# Patient Record
Sex: FEMALE | Race: WHITE | NOT HISPANIC OR LATINO | Employment: UNEMPLOYED | URBAN - METROPOLITAN AREA
[De-identification: names, ages, dates, MRNs, and addresses within clinical notes are randomized per-mention and may not be internally consistent; named-entity substitution may affect disease eponyms.]

---

## 2019-02-15 RX ORDER — FLUTICASONE PROPIONATE 50 MCG
1 SPRAY, SUSPENSION (ML) NASAL DAILY
COMMUNITY

## 2019-02-15 RX ORDER — PIOGLITAZONEHYDROCHLORIDE 45 MG/1
45 TABLET ORAL DAILY
COMMUNITY

## 2019-02-15 RX ORDER — EXEMESTANE 25 MG/1
25 TABLET ORAL DAILY
COMMUNITY
End: 2019-02-27 | Stop reason: SDUPTHER

## 2019-02-15 RX ORDER — CETIRIZINE HYDROCHLORIDE 10 MG/1
10 TABLET ORAL DAILY
COMMUNITY

## 2019-02-15 RX ORDER — POTASSIUM CHLORIDE 20 MEQ/1
20 TABLET, EXTENDED RELEASE ORAL
COMMUNITY

## 2019-02-15 RX ORDER — FENOFIBRATE 160 MG/1
160 TABLET ORAL DAILY
COMMUNITY
End: 2019-03-12

## 2019-02-15 RX ORDER — CEPHALEXIN 250 MG/1
250 CAPSULE ORAL DAILY
COMMUNITY

## 2019-02-15 RX ORDER — ONDANSETRON 4 MG/1
4 TABLET, FILM COATED ORAL EVERY 6 HOURS PRN
COMMUNITY

## 2019-02-15 RX ORDER — METFORMIN HYDROCHLORIDE 500 MG/1
500 TABLET, EXTENDED RELEASE ORAL 2 TIMES DAILY
COMMUNITY
End: 2019-03-13 | Stop reason: SDUPTHER

## 2019-02-15 RX ORDER — ATENOLOL 50 MG/1
50 TABLET ORAL DAILY
COMMUNITY

## 2019-02-15 RX ORDER — ERGOCALCIFEROL 1.25 MG/1
50000 CAPSULE ORAL
COMMUNITY

## 2019-02-15 RX ORDER — MONTELUKAST SODIUM 10 MG/1
10 TABLET ORAL NIGHTLY
COMMUNITY

## 2019-02-15 RX ORDER — OMEPRAZOLE 40 MG/1
40 CAPSULE, DELAYED RELEASE ORAL EVERY MORNING
COMMUNITY
End: 2019-02-20 | Stop reason: SDUPTHER

## 2019-02-20 ENCOUNTER — OFFICE VISIT (OUTPATIENT)
Dept: FAMILY MEDICINE | Facility: CLINIC | Age: 79
End: 2019-02-20
Payer: MEDICARE

## 2019-02-20 VITALS
DIASTOLIC BLOOD PRESSURE: 72 MMHG | SYSTOLIC BLOOD PRESSURE: 136 MMHG | TEMPERATURE: 99 F | OXYGEN SATURATION: 94 % | RESPIRATION RATE: 18 BRPM | HEIGHT: 63 IN | HEART RATE: 99 BPM | BODY MASS INDEX: 21.65 KG/M2 | WEIGHT: 122.19 LBS

## 2019-02-20 DIAGNOSIS — J91.0 PLEURAL EFFUSION, MALIGNANT: ICD-10-CM

## 2019-02-20 DIAGNOSIS — K21.9 GASTROESOPHAGEAL REFLUX DISEASE, ESOPHAGITIS PRESENCE NOT SPECIFIED: Primary | ICD-10-CM

## 2019-02-20 PROCEDURE — 99213 PR OFFICE/OUTPT VISIT, EST, LEVL III, 20-29 MIN: ICD-10-PCS | Mod: ,,, | Performed by: FAMILY MEDICINE

## 2019-02-20 PROCEDURE — 99213 OFFICE O/P EST LOW 20 MIN: CPT | Mod: ,,, | Performed by: FAMILY MEDICINE

## 2019-02-20 RX ORDER — FENOFIBRATE 160 MG/1
1 TABLET ORAL DAILY
COMMUNITY

## 2019-02-20 RX ORDER — MV-MINS NO.73/IRON FUM/FOLIC 106 MG-1MG
1 CAPSULE ORAL DAILY
Refills: 2 | COMMUNITY
Start: 2019-02-08

## 2019-02-20 RX ORDER — OMEPRAZOLE 40 MG/1
40 CAPSULE, DELAYED RELEASE ORAL EVERY MORNING
Qty: 90 CAPSULE | Refills: 3 | Status: SHIPPED | OUTPATIENT
Start: 2019-02-20

## 2019-02-20 RX ORDER — METFORMIN HYDROCHLORIDE 500 MG/1
1 TABLET ORAL DAILY
COMMUNITY
End: 2019-05-17

## 2019-02-20 RX ORDER — ONDANSETRON 4 MG/1
1 TABLET, ORALLY DISINTEGRATING ORAL EVERY 8 HOURS PRN
COMMUNITY
End: 2019-03-12

## 2019-02-20 NOTE — PROGRESS NOTES
Subjective:       Patient ID: Afshan Schultz is a 78 y.o. female.    Chief Complaint: Follow-up (Hospital F/u was hospitalized at [facility] in January)    79 yo F here for f/u because she has not been here for a while.   Pt is doing well today but she had a bad January: low blood count, diarrhea, bleeding polyp in duodenal (benign), blood transfusion, was taken off of Ibrance b/c of low WBC, being put back on ibrance, taken off eliquis and baby aspirin as blood clot in arm had resolved. Pt had EGD and Colonoscopy.  MRI of head shows her 4 nodules being gone/faded.   Pt also had thoracocentesis done a couple of times.  Pt also has had urologist visits. Pt is going to be on cefalexin as a preventive measure.   Pt would like to have omeprazole refilled.  No other problems or concerns right now.          Review of Systems   Constitutional: Negative for activity change, chills, fatigue, fever and unexpected weight change.   HENT: Negative for ear pain, rhinorrhea and trouble swallowing.    Eyes: Negative for pain.   Respiratory: Negative for cough, chest tightness, shortness of breath and wheezing.    Cardiovascular: Negative for chest pain and palpitations.   Gastrointestinal: Negative for abdominal distention, abdominal pain, constipation, diarrhea, nausea and vomiting.   Endocrine: Negative for cold intolerance and heat intolerance.   Genitourinary: Negative for dysuria, frequency and urgency.   Musculoskeletal: Negative for myalgias.   Skin: Negative for rash.   Neurological: Negative for dizziness, syncope, light-headedness and headaches.   Hematological: Does not bruise/bleed easily.   Psychiatric/Behavioral: Negative for agitation and confusion.       Objective:      Physical Exam   Constitutional: She appears well-developed.   HENT:   Right Ear: External ear normal.   Left Ear: External ear normal.   Mouth/Throat: Oropharynx is clear and moist.   Eyes: Conjunctivae and EOM are normal.   Neck: Normal range of motion.    Cardiovascular: Normal rate, regular rhythm and intact distal pulses.   Pulmonary/Chest: Effort normal and breath sounds normal.   Right lower lobe with less air intake   Abdominal: Soft.   Skin: Skin is warm. Capillary refill takes less than 2 seconds.   Psychiatric: She has a normal mood and affect.       Assessment:       1. Gastroesophageal reflux disease, esophagitis presence not specified    2. Pleural effusion, malignant        Plan:       PROBLEM LIST  No problem-specific Assessment & Plan notes found for this encounter.        Afshan was seen today for follow-up.    Diagnoses and all orders for this visit:    Gastroesophageal reflux disease, esophagitis presence not specified  -     omeprazole (PRILOSEC) 40 MG capsule; Take 1 capsule (40 mg total) by mouth every morning.    Pleural effusion, malignant

## 2019-02-23 DIAGNOSIS — Z17.0 MALIGNANT NEOPLASM OF AXILLARY TAIL OF RIGHT BREAST IN FEMALE, ESTROGEN RECEPTOR POSITIVE: ICD-10-CM

## 2019-02-23 DIAGNOSIS — C50.919 MALIGNANT NEOPLASM OF FEMALE BREAST, UNSPECIFIED ESTROGEN RECEPTOR STATUS, UNSPECIFIED LATERALITY, UNSPECIFIED SITE OF BREAST: Primary | ICD-10-CM

## 2019-02-23 DIAGNOSIS — C50.611 MALIGNANT NEOPLASM OF AXILLARY TAIL OF RIGHT BREAST IN FEMALE, ESTROGEN RECEPTOR POSITIVE: ICD-10-CM

## 2019-02-27 DIAGNOSIS — C50.919 MALIGNANT NEOPLASM OF BREAST, STAGE 4, UNSPECIFIED LATERALITY: Primary | ICD-10-CM

## 2019-02-27 RX ORDER — EXEMESTANE 25 MG/1
TABLET ORAL
Qty: 31 TABLET | Refills: 0 | Status: SHIPPED | OUTPATIENT
Start: 2019-02-27 | End: 2019-03-26 | Stop reason: SDUPTHER

## 2019-03-07 DIAGNOSIS — C50.919 MALIGNANT NEOPLASM OF FEMALE BREAST, UNSPECIFIED ESTROGEN RECEPTOR STATUS, UNSPECIFIED LATERALITY, UNSPECIFIED SITE OF BREAST: Primary | ICD-10-CM

## 2019-03-07 RX ORDER — METFORMIN HYDROCHLORIDE 500 MG/1
TABLET, EXTENDED RELEASE ORAL
Qty: 60 TABLET | Refills: 0 | Status: CANCELLED | OUTPATIENT
Start: 2019-03-07

## 2019-03-12 ENCOUNTER — OFFICE VISIT (OUTPATIENT)
Dept: HEMATOLOGY/ONCOLOGY | Facility: CLINIC | Age: 79
End: 2019-03-12
Payer: MEDICARE

## 2019-03-12 VITALS
BODY MASS INDEX: 21.55 KG/M2 | TEMPERATURE: 98 F | HEIGHT: 63 IN | WEIGHT: 121.63 LBS | RESPIRATION RATE: 18 BRPM | OXYGEN SATURATION: 94 % | SYSTOLIC BLOOD PRESSURE: 148 MMHG | DIASTOLIC BLOOD PRESSURE: 75 MMHG | HEART RATE: 76 BPM

## 2019-03-12 DIAGNOSIS — Z17.0 MALIGNANT NEOPLASM OF UPPER-OUTER QUADRANT OF RIGHT BREAST IN FEMALE, ESTROGEN RECEPTOR POSITIVE: ICD-10-CM

## 2019-03-12 DIAGNOSIS — C50.411 MALIGNANT NEOPLASM OF UPPER-OUTER QUADRANT OF RIGHT BREAST IN FEMALE, ESTROGEN RECEPTOR POSITIVE: ICD-10-CM

## 2019-03-12 DIAGNOSIS — C50.919 BREAST CANCER, STAGE 4, UNSPECIFIED LATERALITY: Primary | ICD-10-CM

## 2019-03-12 PROCEDURE — 99215 PR OFFICE/OUTPT VISIT, EST, LEVL V, 40-54 MIN: ICD-10-PCS | Mod: S$GLB,,, | Performed by: NURSE PRACTITIONER

## 2019-03-12 PROCEDURE — 99215 OFFICE O/P EST HI 40 MIN: CPT | Mod: S$GLB,,, | Performed by: NURSE PRACTITIONER

## 2019-03-12 NOTE — PROGRESS NOTES
Subjective:      Patient ID: Afshan Schultz is a 78 y.o. female.    Oncology History:  Oncology History    [address]  Rt breast cancer, mastectomy, chemo, xrt, AI x 5 years at [facility]    7/16 established care with Dr MARIVEL Ortiz            Breast cancer, stage 4, unspecified laterality    12/4/2017 Imaging Significant Findings     PET hypermet mass in hilar region as well and infraclavicular and mediastinum, sclerotic lesions to clementine ribs, questionable left retroperitoneal pelvic LN noted.          12/21/2017 Initial Diagnosis     RUL lung bx consistent with met breast primary  ER 71%, [place] neg, Her 2 neg          1/4/2018 - 8/23/2018 Hormone Therapy     Arimidex + Xgeva started by Dr Ibarra         4/26/2018 - 8/23/2018 Chemotherapy     Ibrance 125 mg added to Arimidex + Xgeva         8/23/2018 -  Chemotherapy     Ibrance + Aromasin + xgeva         2/4/2019 Imaging Significant Findings     MRI Brain stable sub-4 m foci as compared to 11/5/18              Chief Complaint: Breast Cancer    Afshan Schultz is here for Ibrance 125 mg/aromasin/xgeva  Day 20/21 with low ANC noted. Will recheck in 1 week prior to day 1 of next cycle.   So far the patient appears to tolerate chemotherapy fairly well. The patient does have mild fatigue, decreased appetite.  Today the main concern is Pet ordered for march 2019 not completed as of yet. Will have Issa RN f/u on imaging orders.           Past Medical History:   Diagnosis Date    Allergy     Anemia     Arthritis     Breast cancer     Cervical cancer     Diabetes mellitus     Encounter for blood transfusion     Hypertension     Lung cancer     Stroke      Family History   Problem Relation Age of Onset    Heart failure Mother      Social History     Socioeconomic History    Marital status:      Spouse name: Not on file    Number of children: Not on file    Years of education: Not on file    Highest education level: Not on file   Social Needs    Financial resource  strain: Not on file    Food insecurity - worry: Not on file    Food insecurity - inability: Not on file    Transportation needs - medical: Not on file    Transportation needs - non-medical: Not on file   Occupational History    Not on file   Tobacco Use    Smoking status: Never Smoker    Smokeless tobacco: Never Used   Substance and Sexual Activity    Alcohol use: No     Binge frequency: Never    Drug use: No    Sexual activity: Not on file   Other Topics Concern    Not on file   Social History Narrative    Not on file     Past Surgical History:   Procedure Laterality Date    COLONOSCOPY      HYSTERECTOMY      MASTECTOMY Right            Review of systems:  Review of Systems   Constitutional: Positive for appetite change (pt reports eating well, but has lost weight since last visit). Negative for activity change, fatigue, fever and unexpected weight change.   HENT: Negative for dental problem, mouth sores, nosebleeds, sore throat and voice change.    Eyes: Negative for photophobia and visual disturbance.   Respiratory: Negative for cough, chest tightness and shortness of breath.    Cardiovascular: Negative for chest pain, palpitations and leg swelling.   Gastrointestinal: Negative for abdominal distention, abdominal pain, blood in stool, constipation, diarrhea, nausea and vomiting.   Genitourinary: Negative for dysuria and hematuria.   Musculoskeletal: Negative for arthralgias, back pain, gait problem, joint swelling and myalgias.   Skin: Negative for pallor and rash.   Neurological: Negative for dizziness, syncope, weakness, light-headedness, numbness and headaches.   Hematological: Negative for adenopathy. Does not bruise/bleed easily.   Psychiatric/Behavioral: Negative for agitation, confusion and suicidal ideas.       Objective:     Physical Exam  Vitals:    03/12/19 1008   BP: (!) 148/75   Pulse: 76   Resp: 18   Temp: 97.6 °F (36.4 °C)   Body surface area is 1.57 meters  squared.    Labs:  Reviewed ANC 0.87 will recheck in 1 week     Assessment:      1. Breast cancer, stage 4, unspecified laterality    2. Malignant neoplasm of upper-outer quadrant of right breast in female, estrogen receptor positive            Plan:   Breast cancer, stage 4, unspecified laterality  -     NM PET CT Routine Skull to Mid Thigh; Future; Expected date: 03/12/2019  -     CBC w/ DIFF; Future; Expected date: 03/19/2019  -     CBC w/ DIFF; Future; Expected date: 04/12/2019  -     CMP; Future; Expected date: 04/12/2019    Malignant neoplasm of upper-outer quadrant of right breast in female, estrogen receptor positive   -     NM PET CT Routine Skull to Mid Thigh; Future; Expected date: 03/12/2019    Ok to proceed with xgeva as planned for today     Pt undergoing chemotherapy, with risks, side effects and benefits discussed. Pt is instructed to RTC with labs for continued monitoring of treatment as instructed.     Arabella Story, ANP

## 2019-03-13 DIAGNOSIS — E11.9 TYPE 2 DIABETES MELLITUS WITHOUT COMPLICATION, WITHOUT LONG-TERM CURRENT USE OF INSULIN: Primary | ICD-10-CM

## 2019-03-15 RX ORDER — METFORMIN HYDROCHLORIDE 500 MG/1
500 TABLET, EXTENDED RELEASE ORAL 2 TIMES DAILY
Qty: 60 TABLET | Refills: 1 | Status: SHIPPED | OUTPATIENT
Start: 2019-03-15 | End: 2019-05-14 | Stop reason: SDUPTHER

## 2019-03-18 DIAGNOSIS — C50.919 BREAST CANCER, STAGE 4, UNSPECIFIED LATERALITY: Primary | ICD-10-CM

## 2019-03-18 RX ORDER — PALBOCICLIB 125 MG/1
CAPSULE ORAL
Qty: 21 CAPSULE | Refills: 0 | Status: SHIPPED | OUTPATIENT
Start: 2019-03-18 | End: 2019-05-13 | Stop reason: ALTCHOICE

## 2019-03-19 ENCOUNTER — DOCUMENTATION ONLY (OUTPATIENT)
Dept: HEMATOLOGY/ONCOLOGY | Facility: CLINIC | Age: 79
End: 2019-03-19

## 2019-03-19 DIAGNOSIS — C50.919 BREAST CANCER, STAGE 4, UNSPECIFIED LATERALITY: ICD-10-CM

## 2019-03-20 ENCOUNTER — DOCUMENTATION ONLY (OUTPATIENT)
Dept: HEMATOLOGY/ONCOLOGY | Facility: CLINIC | Age: 79
End: 2019-03-20

## 2019-03-20 ENCOUNTER — TELEPHONE (OUTPATIENT)
Dept: HEMATOLOGY/ONCOLOGY | Facility: CLINIC | Age: 79
End: 2019-03-20

## 2019-03-26 DIAGNOSIS — C50.919 MALIGNANT NEOPLASM OF BREAST, STAGE 4, UNSPECIFIED LATERALITY: ICD-10-CM

## 2019-03-27 RX ORDER — EXEMESTANE 25 MG/1
TABLET ORAL
Qty: 31 TABLET | Refills: 0 | Status: SHIPPED | OUTPATIENT
Start: 2019-03-27

## 2019-04-02 LAB
ABS NRBC COUNT: 0 X 10 3/UL (ref 0–0.01)
ABSOLUTE BASOPHIL: 0.02 X 10 3/UL (ref 0–0.22)
ABSOLUTE EOSINOPHIL: 0.02 X 10 3/UL (ref 0.04–0.54)
ABSOLUTE LYMPHOCYTE: 0.73 X 10 3/UL (ref 0.86–4.75)
ABSOLUTE MONOCYTE: 0.09 X 10 3/UL (ref 0.22–1.08)
BASOPHILS NFR BLD: 1 %
EOSINOPHIL NFR BLD: 1 %
HCT VFR BLD AUTO: 32.3 % (ref 37–47)
HGB BLD-MCNC: 9.9 G/DL (ref 12–16)
LYMPHOCYTES NFR BLD: 31 %
MCH RBC QN AUTO: 28.9 PG (ref 27–32)
MCHC RBC AUTO-ENTMCNC: 30.7 G/DL (ref 32–36)
MCV RBC AUTO: 94.2 FL (ref 82–100)
MONOCYTES NFR BLD: 4 %
NEUTROPHILS ABSOLUTE COUNT: 1.49 X 10 3/UL (ref 2.32–6.7)
NUCLEATED RED BLOOD CELLS: 0 /100 WBC (ref 0–0.2)
PLATELET # BLD AUTO: 218 X 10 3/UL (ref 135–400)
RBC # BLD AUTO: 3.43 X 10 6/UL (ref 4.2–5.4)
RBC & PLT MORPHOLOGY: ABNORMAL
RDW-SD: 60.7 FL (ref 37–54)
SEGMENTERS: 63 %
TOTAL GRANULOCYTES: 1.53 X 10 3/UL
WBC # BLD: 2.36 X 10 3/UL (ref 4.3–10.8)

## 2019-04-05 ENCOUNTER — OFFICE VISIT (OUTPATIENT)
Dept: HEMATOLOGY/ONCOLOGY | Facility: CLINIC | Age: 79
End: 2019-04-05
Payer: MEDICARE

## 2019-04-05 VITALS
RESPIRATION RATE: 18 BRPM | DIASTOLIC BLOOD PRESSURE: 64 MMHG | SYSTOLIC BLOOD PRESSURE: 124 MMHG | OXYGEN SATURATION: 95 % | TEMPERATURE: 98 F | HEIGHT: 63 IN | BODY MASS INDEX: 21.44 KG/M2 | WEIGHT: 121 LBS | HEART RATE: 93 BPM

## 2019-04-05 DIAGNOSIS — C50.919 BREAST CANCER, STAGE 4, UNSPECIFIED LATERALITY: Primary | ICD-10-CM

## 2019-04-05 DIAGNOSIS — E46 PROTEIN-CALORIE MALNUTRITION, UNSPECIFIED SEVERITY: ICD-10-CM

## 2019-04-05 PROCEDURE — 99215 OFFICE O/P EST HI 40 MIN: CPT | Mod: S$GLB,,, | Performed by: NURSE PRACTITIONER

## 2019-04-05 PROCEDURE — 99215 PR OFFICE/OUTPT VISIT, EST, LEVL V, 40-54 MIN: ICD-10-PCS | Mod: S$GLB,,, | Performed by: NURSE PRACTITIONER

## 2019-04-05 RX ORDER — LAMOTRIGINE 25 MG/1
500 TABLET ORAL
Status: CANCELLED | OUTPATIENT
Start: 2019-04-10

## 2019-04-05 NOTE — PROGRESS NOTES
Subjective:      Patient ID: Afshan Schultz is a 78 y.o. female.    Oncology History:  Oncology History    [address]  Rt breast cancer, mastectomy, chemo, xrt, AI x 5 years at [facility]    7/16 established care with Dr MARIVEL Ortiz            Breast cancer, stage 4, unspecified laterality    12/4/2017 Imaging Significant Findings     PET hypermet mass in hilar region as well and infraclavicular and mediastinum, sclerotic lesions to clementine ribs, questionable left retroperitoneal pelvic LN noted.          12/21/2017 Initial Diagnosis     RUL lung bx consistent with met breast primary  ER 71%, [place] neg, Her 2 neg          1/4/2018 - 8/23/2018 Hormone Therapy     Arimidex + Xgeva started by Dr Ibarra         4/26/2018 - 8/23/2018 Chemotherapy     Ibrance 125 mg added to Arimidex + Xgeva         8/23/2018 - 4/5/2019 Chemotherapy     Ibrance + Aromasin + xgeva         2/4/2019 Imaging Significant Findings     MRI Brain stable sub-4 m foci as compared to 11/5/18         4/3/2019 Imaging Significant Findings     CT/PET Widespread met dz, with evidence of disease progression with new lesions involving the left axillary LN, Rt hepatic lobe, rt 3rd rib, T12 vertebral body and pelvic region. Additional lesions present demonstrating increased SUV. Large right pleural effusion.          4/8/2019 -  Chemotherapy     Treatment Summary Ibrance + Faslodex + Xgeva  Plan Name: OP BREAST FULVESTRANT  Treatment Goal: Palliative  Status: Active  Start Date: 4/10/2019 (Planned)  End Date: 2/12/2020 (Planned)  Provider: Arabella Story NP  Chemotherapy: fulvestrant (FASLODEX) injection 500 mg, 500 mg, Intramuscular, [facility] [place] [facility] time, 0 of 12 cycles             4/10/2019 -  Chemotherapy     Treatment Summary   Plan Name: OP BREAST FULVESTRANT  Treatment Goal: Palliative  Status: Active  Start Date: 4/10/2019 (Planned)  End Date: 2/12/2020 (Planned)  Provider: Arabella Story NP  Chemotherapy: fulvestrant (FASLODEX) injection 500 mg, 500 mg, Intramuscular,  Clinic/H[facility] 1 time, 0 of 12 cycles              Chief Complaint: Breast Cancer    Afshan Schultz and family are here to discuss recent Ct/PET on 4/3/19 showing progression of disease with new lesions involving left axillary LN, right hepatic lobe, right 3rd rib, T12 and pelvic region. Previously noted lesions appearing more hypermetabolic. Large Rt pleural effusion. Pt currently on Ibrance + Aromasin. Discussed with family and patient additional tx options.    Pt denies any increased pain to back or pelvis, no paresthesia or decreased ROM noted. Pt states breathing abilities stable, she does have JEAN but none at rest. Pt to f/u with pulm next week for evaluation of therapeutic thoracentesis is needed.       Past Medical History:   Diagnosis Date    Allergy     Anemia     Arthritis     Breast cancer     Cancer     Cervical cancer     Diabetes mellitus     Encounter for blood transfusion     GERD (gastroesophageal reflux disease)     Hypertension     Lung cancer     Stroke      Family History   Problem Relation Age of Onset    Heart failure Mother      Social History     Socioeconomic History    Marital status:      Spouse name: Not on file    Number of children: Not on file    Years of education: Not on file    Highest education level: Not on file   Occupational History    Not on file   Social Needs    Financial resource strain: Not on file    Food insecurity:     Worry: Not on file     Inability: Not on file    Transportation needs:     Medical: Not on file     Non-medical: Not on file   Tobacco Use    Smoking status: Never Smoker    Smokeless tobacco: Never Used   Substance and Sexual Activity    Alcohol use: No     Binge frequency: Never    Drug use: No    Sexual activity: Not on file   Lifestyle    Physical activity:     Days per week: Not on file     Minutes per session: Not on file    Stress: Not on file   Relationships    Social connections:     Talks on phone: Not on file      Gets together: Not on file     Attends Yazidism service: Not on file     Active member of club or organization: Not on file     Attends meetings of clubs or organizations: Not on file     Relationship status: Not on file   Other Topics Concern    Not on file   Social History Narrative    Not on file     Past Surgical History:   Procedure Laterality Date    COLONOSCOPY      HYSTERECTOMY      MASTECTOMY Right            Review of systems:  Review of Systems   Constitutional: Positive for appetite change (pt reports eating well, but has lost weight since last visit). Negative for activity change, fatigue, fever and unexpected weight change.   HENT: Negative for dental problem, mouth sores, nosebleeds, sore throat and voice change.    Eyes: Negative for photophobia and visual disturbance.   Respiratory: Negative for cough, chest tightness and shortness of breath.    Cardiovascular: Negative for chest pain, palpitations and leg swelling.   Gastrointestinal: Negative for abdominal distention, abdominal pain, blood in stool, constipation, diarrhea, nausea and vomiting.   Genitourinary: Negative for dysuria and hematuria.   Musculoskeletal: Negative for arthralgias, back pain, gait problem, joint swelling and myalgias.   Skin: Negative for pallor and rash.   Neurological: Negative for dizziness, syncope, weakness, light-headedness, numbness and headaches.   Hematological: Negative for adenopathy. Does not bruise/bleed easily.   Psychiatric/Behavioral: Negative for agitation, confusion and suicidal ideas.       Objective:     Physical Exam   Constitutional: She is oriented to person, place, and time. She appears well-nourished.   Eyes: Pupils are equal, round, and reactive to light. Conjunctivae and EOM are normal.   Neck: Normal range of motion. Neck supple.   Cardiovascular: Normal rate and regular rhythm.   Pulmonary/Chest: Effort normal and breath sounds normal. She exhibits no mass, no tenderness and no  deformity. No breast swelling, tenderness or discharge. Breasts are symmetrical.   Abdominal: Soft. Bowel sounds are normal.   Genitourinary: No breast swelling, tenderness or discharge.   Musculoskeletal: Normal range of motion.   Lymphadenopathy:     She has no cervical adenopathy.   Neurological: She is alert and oriented to person, place, and time.   Skin: Skin is warm and dry.   Psychiatric: She has a normal mood and affect. Her behavior is normal. Judgment and thought content normal.     Vitals:    04/05/19 1103   BP: 124/64   Pulse: 93   Resp: 18   Temp: 97.7 °F (36.5 °C)   Body surface area is 1.56 meters squared.    Labs:    WBC 2.36 ANC 1.49 HGB 9.9 HCT 32.3    Assessment:      1. Breast cancer, stage 4, unspecified laterality    2. Protein-calorie malnutrition, unspecified severity            Plan:   Breast cancer, stage 4, unspecified laterality  -     CBC w/ DIFF; Future; Expected date: 04/05/2019  -     CMP; Future; Expected date: 04/05/2019  -     B-12; Future; Expected date: 04/05/2019    Protein-calorie malnutrition, unspecified severity   -     B-12; Future; Expected date: 04/05/2019      -Discussed recent PET findings with Dr Ortiz, adv continuation of Ibrance with faslodex and to stop Aromasin.  -Pt is to proceed with xgeva as planned for today.  -Will have pt RTC in 2 weeks with labs and second faslodex injection.  -Pt undergoing chemotherapy, with risks, side effects and benefits discussed. Pt is instructed to RTC with labs for continued monitoring of treatment as instructed.     Arabella Story, ANP

## 2019-04-08 PROBLEM — E46 PROTEIN-CALORIE MALNUTRITION: Status: ACTIVE | Noted: 2019-04-08

## 2019-04-11 DIAGNOSIS — C50.919 BREAST CANCER, STAGE 4, UNSPECIFIED LATERALITY: Primary | ICD-10-CM

## 2019-04-17 DIAGNOSIS — C50.919 BREAST CANCER, STAGE 4, UNSPECIFIED LATERALITY: ICD-10-CM

## 2019-04-24 ENCOUNTER — OFFICE VISIT (OUTPATIENT)
Dept: HEMATOLOGY/ONCOLOGY | Facility: CLINIC | Age: 79
End: 2019-04-24
Payer: MEDICARE

## 2019-04-24 VITALS
RESPIRATION RATE: 10 BRPM | DIASTOLIC BLOOD PRESSURE: 70 MMHG | HEART RATE: 84 BPM | BODY MASS INDEX: 20.88 KG/M2 | TEMPERATURE: 98 F | SYSTOLIC BLOOD PRESSURE: 125 MMHG | OXYGEN SATURATION: 84 % | WEIGHT: 117.81 LBS | HEIGHT: 63 IN

## 2019-04-24 DIAGNOSIS — E44.0 MODERATE PROTEIN-CALORIE MALNUTRITION: ICD-10-CM

## 2019-04-24 DIAGNOSIS — J91.0 PLEURAL EFFUSION, MALIGNANT: ICD-10-CM

## 2019-04-24 DIAGNOSIS — C50.919 BREAST CANCER, STAGE 4, UNSPECIFIED LATERALITY: Primary | ICD-10-CM

## 2019-04-24 PROCEDURE — 99215 OFFICE O/P EST HI 40 MIN: CPT | Mod: S$GLB,,, | Performed by: NURSE PRACTITIONER

## 2019-04-24 PROCEDURE — 99215 PR OFFICE/OUTPT VISIT, EST, LEVL V, 40-54 MIN: ICD-10-PCS | Mod: S$GLB,,, | Performed by: NURSE PRACTITIONER

## 2019-04-24 RX ORDER — LAMOTRIGINE 25 MG/1
500 TABLET ORAL
Status: CANCELLED | OUTPATIENT
Start: 2019-04-24

## 2019-04-24 NOTE — PROGRESS NOTES
Subjective:      Patient ID: Afshan Schultz is a 78 y.o. female.    Oncology History:  Oncology History    [address]  Rt breast cancer, mastectomy, chemo, xrt, AI x 5 years at [facility]    7/16 established care with Dr MARIVEL Ortiz            Breast cancer, stage 4, unspecified laterality    12/4/2017 Imaging Significant Findings     PET hypermet mass in hilar region as well and infraclavicular and mediastinum, sclerotic lesions to clementine ribs, questionable left retroperitoneal pelvic LN noted.          12/21/2017 Initial Diagnosis     RUL lung bx consistent with met breast primary  ER 71%, [place] neg, Her 2 neg          1/4/2018 - 8/23/2018 Hormone Therapy     Arimidex + Xgeva started by Dr Ibarra         4/26/2018 - 8/23/2018 Chemotherapy     Ibrance 125 mg added to Arimidex + Xgeva         8/23/2018 - 4/5/2019 Chemotherapy     Ibrance + Aromasin + xgeva         2/4/2019 Imaging Significant Findings     MRI Brain stable sub-4 m foci as compared to 11/5/18         4/3/2019 Imaging Significant Findings     CT/PET Widespread met dz, with evidence of disease progression with new lesions involving the left axillary LN, Rt hepatic lobe, rt 3rd rib, T12 vertebral body and pelvic region. Additional lesions present demonstrating increased SUV. Large right pleural effusion.          4/8/2019 -  Chemotherapy     Treatment Summary Ibrance + Faslodex + Xgeva  Plan Name: OP BREAST FULVESTRANT  Treatment Goal: Palliative  Status: Active  Start Date: 4/10/2019 (Planned)  End Date: 2/12/2020 (Planned)  Provider: Arabella Story NP  Chemotherapy: fulvestrant (FASLODEX) injection 500 mg, 500 mg, Intramuscular, Clinic/HOD 1 time, 0 of 12 cycles             4/10/2019 -  Chemotherapy     Treatment Summary   Plan Name: OP BREAST FULVESTRANT  Treatment Goal: Palliative  Status: Active  Start Date: 4/10/2019  End Date: 2/12/2020 (Planned)  Provider: Arabella Story NP  Chemotherapy: fulvestrant (FASLODEX) injection 500 mg, 500 mg, Intramuscular, Clinic/HOD 1  time, 1 of 12 cycles              Chief Complaint: Breast Cancer    Afshan Schultz has metastatic breast cancer with hx of brain, bone and viseral mets. She is currently on Ibrance/Faslodex. S/P first loading dose of faslodex on 4/10/19.   Pt denies any increased pain to back or pelvis, no paresthesia or decreased ROM noted. Pt states breathing abilities worsening, she does have JEAN and Pox on RA today was 84% but increased to 98% on 2L of oxygen. Pt had therapeutic thoracentesis on Monday approximately 850 cc removed. She states decrease in appetite with 5 # wt loss in last 2 weeks, weakness, unsteady gait, more shuffling noted by family. Overall, appearance of declining functional status. Will proceed with 2nd faslodex injection today and re-evaluate patient in 2 weeks. Discussed with patient and family poor prognosis. Encouraged use of walker and will order home oxygen as well.        IMAGIN/3/19 MRI Brain Stable 4 mm foci    4/3/19 Ct/PET progression of disease with new lesions involving left axillary LN, right hepatic lobe, right 3rd rib, T12 and pelvic region. Previously noted lesions appearing more hypermetabolic. Large Rt pleural effusion.     Past Medical History:   Diagnosis Date    Allergy     Anemia     Arthritis     Breast cancer     Cancer     Cervical cancer     Diabetes mellitus     Encounter for blood transfusion     GERD (gastroesophageal reflux disease)     Hypertension     Lung cancer     Stroke      Family History   Problem Relation Age of Onset    Heart failure Mother      Social History     Socioeconomic History    Marital status:      Spouse name: Not on file    Number of children: Not on file    Years of education: Not on file    Highest education level: Not on file   Occupational History    Not on file   Social Needs    Financial resource strain: Not on file    Food insecurity:     Worry: Not on file     Inability: Not on file    Transportation needs:      Medical: Not on file     Non-medical: Not on file   Tobacco Use    Smoking status: Never Smoker    Smokeless tobacco: Never Used   Substance and Sexual Activity    Alcohol use: No     Binge frequency: Never    Drug use: No    Sexual activity: Not on file   Lifestyle    Physical activity:     Days per week: Not on file     Minutes per session: Not on file    Stress: Not on file   Relationships    Social connections:     Talks on phone: Not on file     Gets together: Not on file     Attends Alevism service: Not on file     Active member of club or organization: Not on file     Attends meetings of clubs or organizations: Not on file     Relationship status: Not on file   Other Topics Concern    Not on file   Social History Narrative    Not on file     Past Surgical History:   Procedure Laterality Date    COLONOSCOPY      HYSTERECTOMY      MASTECTOMY Right            Review of systems:  Review of Systems   Constitutional: Positive for activity change, appetite change (pt reports eating well, but has lost weight since last visit) and fatigue. Negative for fever and unexpected weight change.   HENT: Negative for dental problem, mouth sores, nosebleeds, sore throat and voice change.    Eyes: Negative for photophobia and visual disturbance.   Respiratory: Positive for cough and shortness of breath. Negative for chest tightness.    Cardiovascular: Negative for chest pain, palpitations and leg swelling.   Gastrointestinal: Positive for nausea. Negative for abdominal distention, abdominal pain, blood in stool, constipation, diarrhea and vomiting.   Genitourinary: Negative for dysuria and hematuria.   Musculoskeletal: Positive for arthralgias and myalgias. Negative for back pain, gait problem and joint swelling.   Skin: Positive for pallor. Negative for rash.   Neurological: Positive for weakness and headaches. Negative for dizziness, syncope, light-headedness and numbness.   Hematological: Negative for  adenopathy. Does not bruise/bleed easily.   Psychiatric/Behavioral: Negative for agitation, confusion and suicidal ideas.       Objective:     Physical Exam   Constitutional: She is oriented to person, place, and time. She appears well-nourished.   Eyes: Pupils are equal, round, and reactive to light. Conjunctivae and EOM are normal.   Neck: Normal range of motion. Neck supple.   Cardiovascular: Normal rate and regular rhythm.   Pulmonary/Chest: Effort normal and breath sounds normal. She exhibits no mass, no tenderness and no deformity. No breast swelling, tenderness or discharge. Breasts are symmetrical.   Abdominal: Soft. Bowel sounds are normal.   Musculoskeletal: Normal range of motion.   Lymphadenopathy:     She has no cervical adenopathy.   Neurological: She is alert and oriented to person, place, and time.   Skin: Skin is warm and dry.   Psychiatric: She has a normal mood and affect. Her behavior is normal. Judgment and thought content normal.     Vitals:    04/24/19 1107   BP: 125/70   Pulse: 84   Resp: 10   Temp: 97.7 °F (36.5 °C)   Body surface area is 1.54 meters squared.    Labs:    WBC 2.24 ANC 0.85 HGB 10.3 HCT 33.4   CR 0.97 Ca 10.6 AST 49 ALT 14  Assessment:      1. Breast cancer, stage 4, unspecified laterality    2. Moderate protein-calorie malnutrition    3. Pleural effusion, malignant           Plan:   Breast cancer, stage 4, unspecified laterality  -     walker (ULTRA-LIGHT ROLLATOR) Misc; Pt has met breast cancer with weakness, gait disturbances and balance impairment  Dispense: 1 each; Refill: 0    Moderate protein-calorie malnutrition      -Pt cleared for second faslodex injection for today.   -Pt is to proceed with xgeva as planned for next visit.  -Will have pt RTC in 2 weeks with labs and third faslodex injection.  -home oxygen prescribed.  -Rollator prescribed today and encourage patient compliance. Discussed risk of fall and implications of fall on QOL.  -Discussed and encourage  caloric intake.   -Discussed with patient and family declining functional status and QOL implications.     Arabella Story, ANP

## 2019-05-08 ENCOUNTER — OFFICE VISIT (OUTPATIENT)
Dept: HEMATOLOGY/ONCOLOGY | Facility: CLINIC | Age: 79
End: 2019-05-08
Payer: MEDICARE

## 2019-05-08 VITALS
SYSTOLIC BLOOD PRESSURE: 100 MMHG | WEIGHT: 111.63 LBS | OXYGEN SATURATION: 89 % | BODY MASS INDEX: 19.78 KG/M2 | RESPIRATION RATE: 10 BRPM | HEIGHT: 63 IN | TEMPERATURE: 99 F | HEART RATE: 74 BPM | DIASTOLIC BLOOD PRESSURE: 59 MMHG

## 2019-05-08 DIAGNOSIS — C50.919 BREAST CANCER, STAGE 4, UNSPECIFIED LATERALITY: Primary | ICD-10-CM

## 2019-05-08 DIAGNOSIS — E46 PROTEIN-CALORIE MALNUTRITION, UNSPECIFIED SEVERITY: ICD-10-CM

## 2019-05-08 DIAGNOSIS — J91.0 PLEURAL EFFUSION, MALIGNANT: ICD-10-CM

## 2019-05-08 DIAGNOSIS — Z91.89 AT RISK FOR PROGRESSION OF DISEASE: ICD-10-CM

## 2019-05-08 PROCEDURE — 99215 PR OFFICE/OUTPT VISIT, EST, LEVL V, 40-54 MIN: ICD-10-PCS | Mod: S$GLB,,, | Performed by: NURSE PRACTITIONER

## 2019-05-08 PROCEDURE — 99215 OFFICE O/P EST HI 40 MIN: CPT | Mod: S$GLB,,, | Performed by: NURSE PRACTITIONER

## 2019-05-08 NOTE — PROGRESS NOTES
Subjective:      Patient ID: Afshan Schultz is a 78 y.o. female.    Oncology History:  Oncology History    [address]  Rt breast cancer, mastectomy, chemo, xrt, AI x 5 years at [facility]    7/16 established care with Dr MARIVEL Ortiz            Breast cancer, stage 4, unspecified laterality    12/4/2017 Imaging Significant Findings     PET hypermet mass in hilar region as well and infraclavicular and mediastinum, sclerotic lesions to clementine ribs, questionable left retroperitoneal pelvic LN noted.          12/21/2017 Initial Diagnosis     RUL lung bx consistent with met breast primary  ER 71%, CO neg, Her 2 neg          1/4/2018 - 8/23/2018 Hormone Therapy     Arimidex + Xgeva started by Dr Ibarra         4/26/2018 - 8/23/2018 Chemotherapy     Ibrance 125 mg added to Arimidex + Xgeva         8/23/2018 - 4/5/2019 Chemotherapy     Ibrance + Aromasin + xgeva         2/4/2019 Imaging Significant Findings     MRI Brain stable sub-4 m foci as compared to 11/5/18         4/3/2019 Imaging Significant Findings     CT/PET Widespread met dz, with evidence of disease progression with new lesions involving the left axillary LN, Rt hepatic lobe, rt 3rd rib, T12 vertebral body and pelvic region. Additional lesions present demonstrating increased SUV. Large right pleural effusion.          4/8/2019 -  Chemotherapy     Treatment Summary Ibrance + Faslodex + Xgeva  Plan Name: OP BREAST FULVESTRANT  Treatment Goal: Palliative  Status: Active  Start Date: 4/10/2019 (Planned)  End Date: 2/12/2020 (Planned)  Provider: Arabella Story NP  Chemotherapy: fulvestrant (FASLODEX) injection 500 mg, 500 mg, Intramuscular, Clinic/HOD 1 time, 0 of 12 cycles             4/10/2019 -  Chemotherapy     Treatment Summary   Plan Name: OP BREAST FULVESTRANT  Treatment Goal: Palliative  Status: Active  Start Date: 4/10/2019  End Date: 2/21/2020 (Planned)  Provider: Arabella Story NP  Chemotherapy: fulvestrant (FASLODEX) injection 500 mg, 500 mg, Intramuscular, Clinic/HOD 1  time, 1 of 12 cycles              Chief Complaint: Breast Cancer    Afshan Schultz has metastatic breast cancer with hx of brain, bone and viseral mets. She is currently on Ibrance/Faslodex. S/P first loading dose of faslodex on 4/10/19.   Pt denies any increased pain to back or pelvis, no paresthesia or decreased ROM noted. Pt states breathing abilities worsening, she does have JEAN and Pox on RA today was 84% but increased to 98% on 2L of oxygen. Pt had therapeutic thoracentesis on 19 approximately 850 cc removed. Very limited air movement in right lung. Discussed with pt possible plurex catheter but she adamantly refuses. She does not want repeat thoracentesis at this time because she says the last was very painful and she did not feel any better afterwards.  She states decrease in appetite with 10 # wt loss in last 2 weeks, weakness, fatigue and unsteady gait, more shuffling noted by family. Overall, appearance of declining functional status. Will proceed with 2nd faslodex injection today and re-evaluate patient in 2 weeks. Discussed with patient and family poor prognosis. Encouraged use of walker and will order home oxygen as well.      Discussed with patient and her  end of life decisions. She does have a living will. Discussed that likely we are not managing progression of cancer well with current therapy combined with her apparent decline in functional status that hospice is recommended. Pt and  are to discuss with their adult children this weekend and RTC in 1 week to further discuss.     Will hold today's xgeva and faslodex as patient is too weak to ambulate to infusion bay.     IMAGIN/3/19 MRI Brain Stable 4 mm foci    4/3/19 Ct/PET progression of disease with new lesions involving left axillary LN, right hepatic lobe, right 3rd rib, T12 and pelvic region. Previously noted lesions appearing more hypermetabolic. Large Rt pleural effusion.     Past Medical History:   Diagnosis Date     Allergy     Anemia     Arthritis     Breast cancer     Cancer     Cervical cancer     Diabetes mellitus     Encounter for blood transfusion     GERD (gastroesophageal reflux disease)     Hypertension     Lung cancer     Stroke      Family History   Problem Relation Age of Onset    Heart failure Mother      Social History     Socioeconomic History    Marital status:      Spouse name: Not on file    Number of children: Not on file    Years of education: Not on file    Highest education level: Not on file   Occupational History    Not on file   Social Needs    Financial resource strain: Not on file    Food insecurity:     Worry: Not on file     Inability: Not on file    Transportation needs:     Medical: Not on file     Non-medical: Not on file   Tobacco Use    Smoking status: Never Smoker    Smokeless tobacco: Never Used   Substance and Sexual Activity    Alcohol use: No     Binge frequency: Never    Drug use: No    Sexual activity: Not on file   Lifestyle    Physical activity:     Days per week: Not on file     Minutes per session: Not on file    Stress: Not on file   Relationships    Social connections:     Talks on phone: Not on file     Gets together: Not on file     Attends Religion service: Not on file     Active member of club or organization: Not on file     Attends meetings of clubs or organizations: Not on file     Relationship status: Not on file   Other Topics Concern    Not on file   Social History Narrative    Not on file     Past Surgical History:   Procedure Laterality Date    COLONOSCOPY      HYSTERECTOMY      MASTECTOMY Right            Review of systems:  Review of Systems   Constitutional: Positive for activity change, appetite change (pt reports eating well, but has lost weight since last visit) and fatigue. Negative for fever and unexpected weight change.   HENT: Negative for dental problem, mouth sores, nosebleeds, sore throat and voice change.     Eyes: Negative for photophobia and visual disturbance.   Respiratory: Positive for cough and shortness of breath. Negative for chest tightness.    Cardiovascular: Negative for chest pain, palpitations and leg swelling.   Gastrointestinal: Positive for nausea. Negative for abdominal distention, abdominal pain, blood in stool, constipation, diarrhea and vomiting.   Genitourinary: Negative for dysuria and hematuria.   Musculoskeletal: Positive for arthralgias and myalgias. Negative for back pain, gait problem and joint swelling.   Skin: Positive for pallor. Negative for rash.   Neurological: Positive for weakness and headaches. Negative for dizziness, syncope, light-headedness and numbness.   Hematological: Negative for adenopathy. Does not bruise/bleed easily.   Psychiatric/Behavioral: Negative for agitation, confusion and suicidal ideas.       Objective:     Physical Exam   Constitutional: She is oriented to person, place, and time. She appears well-nourished.   Eyes: Pupils are equal, round, and reactive to light. Conjunctivae and EOM are normal.   Neck: Normal range of motion. Neck supple.   Cardiovascular: Normal rate and regular rhythm.   Pulmonary/Chest: Accessory muscle usage present. She has decreased breath sounds in the right upper field, the right middle field and the right lower field. She exhibits no mass, no tenderness and no deformity. No breast swelling, tenderness or discharge. Breasts are symmetrical.   Abdominal: Soft. Bowel sounds are normal.   Musculoskeletal: Normal range of motion.   Lymphadenopathy:     She has no cervical adenopathy.   Neurological: She is alert and oriented to person, place, and time.   Skin: Skin is warm and dry.   Psychiatric: She has a normal mood and affect. Her behavior is normal. Judgment and thought content normal.     Vitals:    05/08/19 0952   BP: (!) 100/59   Pulse: 74   Resp: 10   Temp: 98.6 °F (37 °C)   Body surface area is 1.5 meters squared.    Labs:    WBC  2.91 ANC 1.64 HGB 10.5 HCT 34.7   CR 1.00 Ca 10.3 AST 53 ALT 19    Assessment:      1. Breast cancer, stage 4, unspecified laterality    2. Protein-calorie malnutrition, unspecified severity    3. Pleural effusion, malignant    4. At risk for progression of disease           Plan:   Breast cancer, stage 4, unspecified laterality    Protein-calorie malnutrition, unspecified severity    Pleural effusion, malignant    At risk for progression of disease      1. Long discussion with patient and  End of Life goals, quality of life measures, and possibility of best supportive care.  2. Patient to discuss with adult children this weekend and make decision.  3. Discussed possibility of pleurex catheter and pt declined.  4. Pt and family to return next Friday, if she would like to continue therapy will send for Faslodex, xgeva vs hospice consult.     Arabella Story, ANP

## 2019-05-13 ENCOUNTER — TELEPHONE (OUTPATIENT)
Dept: HEMATOLOGY/ONCOLOGY | Facility: CLINIC | Age: 79
End: 2019-05-13

## 2019-05-13 DIAGNOSIS — C50.919 BREAST CANCER, STAGE 4, UNSPECIFIED LATERALITY: ICD-10-CM

## 2019-05-13 DIAGNOSIS — Z91.89 AT RISK FOR PROGRESSION OF DISEASE: ICD-10-CM

## 2019-05-13 DIAGNOSIS — J91.0 PLEURAL EFFUSION, MALIGNANT: Primary | ICD-10-CM

## 2019-05-13 NOTE — TELEPHONE ENCOUNTER
Mr Nelson called today stating that Mrs Schultz has decided to forego any additional treatments and would like a hospice consult. Patient requested [facility]. Will make referral today. Advised family to call with any needs or concerns.

## 2019-05-14 DIAGNOSIS — E11.9 TYPE 2 DIABETES MELLITUS WITHOUT COMPLICATION, WITHOUT LONG-TERM CURRENT USE OF INSULIN: ICD-10-CM

## 2019-05-14 RX ORDER — PALBOCICLIB 125 MG/1
CAPSULE ORAL
Qty: 21 CAPSULE | Refills: 0 | OUTPATIENT
Start: 2019-05-14

## 2019-05-15 RX ORDER — MV-MINS NO.73/IRON FUM/FOLIC 106 MG-1MG
1 CAPSULE ORAL DAILY
Qty: 30 CAPSULE | Refills: 2 | OUTPATIENT
Start: 2019-05-15

## 2019-05-17 RX ORDER — METFORMIN HYDROCHLORIDE 500 MG/1
TABLET, EXTENDED RELEASE ORAL
Qty: 60 TABLET | Refills: 0 | Status: SHIPPED | OUTPATIENT
Start: 2019-05-17